# Patient Record
Sex: MALE | Race: WHITE | NOT HISPANIC OR LATINO | Employment: FULL TIME | URBAN - METROPOLITAN AREA
[De-identification: names, ages, dates, MRNs, and addresses within clinical notes are randomized per-mention and may not be internally consistent; named-entity substitution may affect disease eponyms.]

---

## 2017-04-19 ENCOUNTER — OFFICE VISIT (OUTPATIENT)
Dept: URGENT CARE | Facility: PHYSICIAN GROUP | Age: 27
End: 2017-04-19
Payer: COMMERCIAL

## 2017-04-19 VITALS
HEART RATE: 84 BPM | SYSTOLIC BLOOD PRESSURE: 122 MMHG | TEMPERATURE: 97.7 F | WEIGHT: 134 LBS | DIASTOLIC BLOOD PRESSURE: 70 MMHG | BODY MASS INDEX: 17.76 KG/M2 | HEIGHT: 73 IN | RESPIRATION RATE: 16 BRPM | OXYGEN SATURATION: 98 %

## 2017-04-19 DIAGNOSIS — H60.331 ACUTE SWIMMER'S EAR OF RIGHT SIDE: ICD-10-CM

## 2017-04-19 PROCEDURE — 99214 OFFICE O/P EST MOD 30 MIN: CPT | Performed by: FAMILY MEDICINE

## 2017-04-19 RX ORDER — CIPROFLOXACIN AND DEXAMETHASONE 3; 1 MG/ML; MG/ML
4 SUSPENSION/ DROPS AURICULAR (OTIC) 2 TIMES DAILY
Qty: 1 BOTTLE | Refills: 0 | Status: SHIPPED | OUTPATIENT
Start: 2017-04-19 | End: 2017-04-26

## 2017-04-19 RX ORDER — PREDNISONE 20 MG/1
40 TABLET ORAL EVERY MORNING
Qty: 12 TAB | Refills: 0 | Status: SHIPPED | OUTPATIENT
Start: 2017-04-19 | End: 2017-04-25

## 2017-04-19 ASSESSMENT — ENCOUNTER SYMPTOMS
DIZZINESS: 1
FOCAL WEAKNESS: 0
CHILLS: 0
FEVER: 0
SORE THROAT: 0

## 2017-04-19 NOTE — MR AVS SNAPSHOT
"        Praveen Martin   2017 9:45 AM   Office Visit   MRN: [id number]    Department:  [facility]   Dept Phone:  [phone]    Description:  Male : 1990   Provider:  Silvio Bond M.D.           Reason for Visit     Otalgia           Allergies as of 2017     No Known Allergies      You were diagnosed with     Acute swimmer's ear of right side   [[id number]]         Vital Signs     Blood Pressure Pulse Temperature Respirations Height Weight    122/70 mmHg 84 36.5 °C (97.7 °F) 16 1.854 m (6' 0.99\") 60.782 kg (134 lb)    Body Mass Index Oxygen Saturation Smoking Status             17.68 kg/m2 98% Never Smoker          Basic Information     Date Of Birth Sex Race Ethnicity Preferred Language    1990 Male White Non- English      Health Maintenance        Date Due Completion Dates    IMM HEP B VACCINE (1 of 3 - Primary Series) 1989 ---    IMM HEP A VACCINE (1 of 2 - Standard Series) 1990 ---    IMM VARICELLA (CHICKENPOX) VACCINE (1 of 2 - 2 Dose Adolescent Series) 2002 ---    IMM DTaP/Tdap/Td Vaccine (1 - Tdap) 2008 ---            Current Immunizations     No immunizations on file.      Below and/or attached are the medications your provider expects you to take. Review all of your home medications and newly ordered medications with your provider and/or pharmacist. Follow medication instructions as directed by your provider and/or pharmacist. Please keep your medication list with you and share with your provider. Update the information when medications are discontinued, doses are changed, or new medications (including over-the-counter products) are added; and carry medication information at all times in the event of emergency situations     Allergies:  No Known Allergies          Medications  Valid as of: 2017 - 10:45 AM    Generic Name Brand Name Tablet Size Instructions for use    Ciprofloxacin-Dexamethasone (Suspension) CIPRODEX 0.3-0.1 % Place 4 Drops in " right ear 2 times a day for 7 days.        Ondansetron HCl (Tab) ZOFRAN 4 MG Take 1 Tab by mouth every 8 hours as needed for Nausea/Vomiting.        PredniSONE (Tab) DELTASONE 20 MG Take 2 Tabs by mouth every morning for 6 days.        .                 Medicines prescribed today were sent to:     [id number]  [place], [place] - [address] [facility]    [address] [place] [address]    Phone: [phone] Fax: [phone]    Open 24 Hours?: No      Medication refill instructions:       If your prescription bottle indicates you have medication refills left, it is not necessary to call your provider’s office. Please contact your pharmacy and they will refill your medication.    If your prescription bottle indicates you do not have any refills left, you may request refills at any time through one of the following ways: The online Selvz system (except Urgent Care), by calling your provider’s office, or by asking your pharmacy to contact your provider’s office with a refill request. Medication refills are processed only during regular business hours and may not be available until the next business day. Your provider may request additional information or to have a follow-up visit with you prior to refilling your medication.   *Please Note: Medication refills are assigned a new Rx number when refilled electronically. Your pharmacy may indicate that no refills were authorized even though a new prescription for the same medication is available at the pharmacy. Please request the medicine by name with the pharmacy before contacting your provider for a refill.           Selvz Access Code: [email or web address]  Expires: 5/10/2017  1:03 PM    Selvz  A secure, online tool to manage your health information     [employer]’s Selvz® is a secure, online tool that connects you to your personalized health information from the privacy of your home -- day or night - making it very easy for you to manage your healthcare. Once the  activation process is completed, you can even access your medical information using the [employer] yarely, which is available for free in the Apple Yarely store or Google Play store.     [employer] provides the following levels of access (as shown below):   My Chart Features   [facility] Doctor [facility]  Specialists [facility]  Urgent  Care Non-[facility]  Primary Care  Doctor   Email your healthcare team securely and privately 24/7 X X X    Manage appointments: schedule your next appointment; view details of past/upcoming appointments X      Request prescription refills. X      View recent personal medical records, including lab and immunizations X X X X   View health record, including health history, allergies, medications X X X X   Read reports about your outpatient visits, procedures, consult and ER notes X X X X   See your discharge summary, which is a recap of your hospital and/or ER visit that includes your diagnosis, lab results, and care plan. X X       How to register for [employer]:  1. Go to  [email or web address].  2. Click on the Sign Up Now box, which takes you to the New Member Sign Up page. You will need to provide the following information:  a. Enter your [employer] Access Code exactly as it appears at the top of this page. (You will not need to use this code after you’ve completed the sign-up process. If you do not sign up before the expiration date, you must request a new code.)   b. Enter your date of birth.   c. Enter your home email address.   d. Click Submit, and follow the next screen’s instructions.  3. Create a [employer] ID. This will be your [employer] login ID and cannot be changed, so think of one that is secure and easy to remember.  4. Create a [employer] password. You can change your password at any time.  5. Enter your Password Reset Question and Answer. This can be used at a later time if you forget your password.   6. Enter your e-mail address. This allows you to receive e-mail notifications when new  information is available in [employer].  7. Click Sign Up. You can now view your health information.    For assistance activating your [employer] account, call [phone]

## 2017-04-19 NOTE — PROGRESS NOTES
"Subjective:      Praveen Martin is a 26 y.o. male who presents with Otalgia    Chief Complaint   Patient presents with   • Otalgia        - This is a very pleasant 26 y.o. male with complaints of Rt ear pain x 5 days. No NVFC/trauma or bleeding discharge from ear           ALLERGIES:  Review of patient's allergies indicates no known allergies.     PMH:  No past medical history on file.     MEDS:    Current outpatient prescriptions:   •  ciprofloxacin/dexamethasone (CIPRODEX) 0.3-0.1 % Suspension, Place 4 Drops in right ear 2 times a day for 7 days., Disp: 1 Bottle, Rfl: 0  •  ondansetron (ZOFRAN) 4 MG Tab tablet, Take 1 Tab by mouth every 8 hours as needed for Nausea/Vomiting., Disp: 6 Tab, Rfl: 1    ** Past medical, social, family and surgical history otherwise negative or non contributory **             Otalgia   Pertinent negatives include no sore throat.       Review of Systems   Constitutional: Negative for fever and chills.   HENT: Positive for ear pain. Negative for sore throat.    Neurological: Positive for dizziness. Negative for focal weakness.          Objective:     /70 mmHg  Pulse 84  Temp(Src) 36.5 °C (97.7 °F)  Resp 16  Ht 1.854 m (6' 0.99\")  Wt 60.782 kg (134 lb)  BMI 17.68 kg/m2  SpO2 98%     Physical Exam   Constitutional: He appears well-developed. No distress.   HENT:   Head: Normocephalic and atraumatic.   Mouth/Throat: Oropharynx is clear and moist.   Eyes: Conjunctivae are normal.   Neck: Neck supple.   Cardiovascular: Regular rhythm.    No murmur heard.  Neurological: He is alert. He exhibits normal muscle tone.   Skin: Skin is warm and dry.   Psychiatric: He has a normal mood and affect. Judgment normal.   Nursing note and vitals reviewed.  Rt ear: tender tragus, eac red swollen, TM pink/full/serous fluid  Lt ear: unremarkable             Assessment/Plan:         1. Acute swimmer's ear of right side  ciprofloxacin/dexamethasone (CIPRODEX) 0.3-0.1 % Suspension             Dx & d/c " instructions discussed w/ patient and/or family members. Follow up w/ Prvt Dr or here in 3-4 days if not getting better, sooner if needed,  ER if worse and UC/PCP unavailable.        Possible side effects (i.e. Rash, GI upset/constipation, sedation, elevation of BP or sugars) of any medications given discussed.

## 2020-11-12 ENCOUNTER — OFFICE VISIT (OUTPATIENT)
Dept: URGENT CARE | Facility: CLINIC | Age: 30
End: 2020-11-12
Payer: COMMERCIAL

## 2020-11-12 VITALS
RESPIRATION RATE: 16 BRPM | SYSTOLIC BLOOD PRESSURE: 122 MMHG | OXYGEN SATURATION: 98 % | HEART RATE: 86 BPM | TEMPERATURE: 97.5 F | DIASTOLIC BLOOD PRESSURE: 80 MMHG | HEIGHT: 73 IN | WEIGHT: 131 LBS | BODY MASS INDEX: 17.36 KG/M2

## 2020-11-12 DIAGNOSIS — R07.89 TIGHT CHEST: ICD-10-CM

## 2020-11-12 DIAGNOSIS — R42 DIZZINESS: ICD-10-CM

## 2020-11-12 PROCEDURE — 99215 OFFICE O/P EST HI 40 MIN: CPT | Performed by: NURSE PRACTITIONER

## 2020-11-12 PROCEDURE — 93000 ELECTROCARDIOGRAM COMPLETE: CPT | Performed by: NURSE PRACTITIONER

## 2020-11-12 RX ORDER — IBUPROFEN 200 MG
200 TABLET ORAL EVERY 6 HOURS PRN
COMMUNITY
End: 2021-01-07

## 2020-11-12 ASSESSMENT — ENCOUNTER SYMPTOMS
FEVER: 0
WEAKNESS: 0
SINUS PAIN: 0
SORE THROAT: 0
SENSORY CHANGE: 0
BACK PAIN: 0
ORTHOPNEA: 0
HEADACHES: 0
NAUSEA: 1
PALPITATIONS: 0
TINGLING: 0
DIZZINESS: 1
COUGH: 0
SPUTUM PRODUCTION: 0
SHORTNESS OF BREATH: 0
WHEEZING: 0
CHILLS: 0
MYALGIAS: 0

## 2020-11-13 NOTE — PROGRESS NOTES
Subjective:      Praveen Martin is a 30 y.o. male who presents with Chest Pain (x 4 days, chest tightness and pain accross chest, rapid heart beat, dizziness and shaky)            HPI  Chest pressure and pain with dizziness on and off x 1 month but has increased x 4 days, worst today. Father h/o MI in 50s. Non-smoker, no regular alcohol use. Mild nausea with dizziness. New PCP visit in December. Admits to no regular health checks. Headaches this week. Denies current back or abdominal pain. Stomach ache in last 2 days, not today. Denies heartburn.     PMH:  has no past medical history on file.  MEDS:   Current Outpatient Medications:   •  ibuprofen (MOTRIN) 200 MG Tab, Take 200 mg by mouth every 6 hours as needed., Disp: , Rfl:   •  ondansetron (ZOFRAN) 4 MG Tab tablet, Take 1 Tab by mouth every 8 hours as needed for Nausea/Vomiting. (Patient not taking: Reported on 11/12/2020), Disp: 6 Tab, Rfl: 1  ALLERGIES: No Known Allergies  SURGHX: History reviewed. No pertinent surgical history.  SOCHX:  reports that he has never smoked. He has never used smokeless tobacco.  FH: Family history was reviewed, no pertinent findings to report    Review of Systems   Constitutional: Negative for chills, fever and malaise/fatigue.   HENT: Negative for congestion, ear pain, sinus pain and sore throat.    Respiratory: Negative for cough, sputum production, shortness of breath and wheezing.    Cardiovascular: Positive for chest pain. Negative for palpitations and orthopnea.   Gastrointestinal: Positive for nausea.   Musculoskeletal: Negative for back pain and myalgias.   Skin: Negative for itching and rash.   Neurological: Positive for dizziness. Negative for tingling, sensory change, weakness and headaches.   Endo/Heme/Allergies: Negative for environmental allergies.   All other systems reviewed and are negative.         Objective:     /80 (BP Location: Left arm, Patient Position: Sitting, BP Cuff Size: Adult)   Pulse 86   Temp  "36.4 °C (97.5 °F) (Temporal)   Resp 16   Ht 1.854 m (6' 1\")   Wt 59.4 kg (131 lb)   SpO2 98%   BMI 17.28 kg/m²      Physical Exam  Vitals signs reviewed.   Constitutional:       General: He is awake. He is not in acute distress.     Appearance: Normal appearance. He is well-developed. He is not ill-appearing, toxic-appearing or diaphoretic.   HENT:      Head: Normocephalic.      Nose: Mucosal edema present.   Cardiovascular:      Rate and Rhythm: Normal rate and regular rhythm.      Heart sounds: Normal heart sounds, S1 normal and S2 normal. Heart sounds not distant. No murmur. No friction rub. No gallop.    Pulmonary:      Effort: Pulmonary effort is normal. No tachypnea, accessory muscle usage or respiratory distress.      Breath sounds: Normal breath sounds and air entry.   Abdominal:      General: There is no distension.      Palpations: Abdomen is soft.      Tenderness: There is no abdominal tenderness.   Musculoskeletal: Normal range of motion.   Skin:     General: Skin is warm and dry.   Neurological:      Mental Status: He is alert and oriented to person, place, and time.      GCS: GCS eye subscore is 4. GCS verbal subscore is 5. GCS motor subscore is 6.   Psychiatric:         Attention and Perception: Attention and perception normal.         Mood and Affect: Mood and affect normal.         Speech: Speech normal.         Behavior: Behavior normal. Behavior is cooperative.         Thought Content: Thought content normal.         Cognition and Memory: Cognition and memory normal.         Judgment: Judgment normal.                 Assessment/Plan:        1. Tight chest    - EKG - Cardiology Performed; Future: NSR, HR 81, ST,probable normal early repol; atrial premature complex    2. Dizziness    - EKG - Cardiology Performed; Future    Refer to ER for further evaluation for increased chest pressure and pain, episode of dizziness and nausea today  Patient stable, vitals stable  Patient to go POV with wife to " ER

## 2021-01-06 ASSESSMENT — ENCOUNTER SYMPTOMS
WHEEZING: 0
DYSPNEA ON EXERTION: 0
IRREGULAR HEARTBEAT: 0
ABDOMINAL PAIN: 0
SYNCOPE: 0
ORTHOPNEA: 0
NAUSEA: 0
FOCAL WEAKNESS: 0
DIARRHEA: 0
SHORTNESS OF BREATH: 0
VOMITING: 0
PND: 0
WEAKNESS: 0
FEVER: 0
NIGHT SWEATS: 0
COUGH: 0
NEAR-SYNCOPE: 0
DIZZINESS: 0

## 2021-01-07 ENCOUNTER — OFFICE VISIT (OUTPATIENT)
Dept: CARDIOLOGY | Facility: MEDICAL CENTER | Age: 31
End: 2021-01-07
Payer: COMMERCIAL

## 2021-01-07 VITALS
HEART RATE: 88 BPM | RESPIRATION RATE: 18 BRPM | OXYGEN SATURATION: 96 % | SYSTOLIC BLOOD PRESSURE: 92 MMHG | DIASTOLIC BLOOD PRESSURE: 64 MMHG | HEIGHT: 73 IN | WEIGHT: 128 LBS | BODY MASS INDEX: 16.96 KG/M2

## 2021-01-07 DIAGNOSIS — R00.2 PALPITATIONS: ICD-10-CM

## 2021-01-07 DIAGNOSIS — R07.89 OTHER CHEST PAIN: ICD-10-CM

## 2021-01-07 PROCEDURE — 99203 OFFICE O/P NEW LOW 30 MIN: CPT | Performed by: STUDENT IN AN ORGANIZED HEALTH CARE EDUCATION/TRAINING PROGRAM

## 2021-01-07 SDOH — HEALTH STABILITY: MENTAL HEALTH: HOW MANY STANDARD DRINKS CONTAINING ALCOHOL DO YOU HAVE ON A TYPICAL DAY?: 1 OR 2

## 2021-01-07 SDOH — HEALTH STABILITY: MENTAL HEALTH: HOW OFTEN DO YOU HAVE 6 OR MORE DRINKS ON ONE OCCASION?: WEEKLY

## 2021-01-07 SDOH — HEALTH STABILITY: MENTAL HEALTH: HOW OFTEN DO YOU HAVE A DRINK CONTAINING ALCOHOL?: MONTHLY OR LESS

## 2021-01-07 ASSESSMENT — ENCOUNTER SYMPTOMS: PALPITATIONS: 1

## 2021-01-07 NOTE — PATIENT INSTRUCTIONS
Schedule echocardiogram  Schedule treadmill EKG  Schedule event monitor    Obtain labs ordered by your primary care physician

## 2021-01-07 NOTE — PROGRESS NOTES
Cardiology Initial Consultation Note    Date of note:    1/7/2021    Primary Care Provider: Pcp Pt States None  Referring Provider: Yesi Waller*     Patient Name: Praveen Martin     YOB: 1990  MRN:              [id number]    Chief Complaint: Chest pain and palpitations    History of Present Illness: Mr. Praveen Martin is a 30 y.o. male with no prior cardiac history who is here for cardiac consultation for chest pain and palpitations.    The patient was last seen in urgent care clinic on November 12, 2020 for chest pain and palpitations.  He had been having chest pain with palpitations for the last 2 months.  He was subsequently seen in the ER, and EKG was normal and troponin was normal.  As such the patient was discharged to follow-up in cardiology clinic.  The patient was seen in primary care clinic on December 21, 2020 after ER visit.  He reports persistent chest pain.    He presents today with complaints of chest pain and dizziness daily for the last two months.  He reports that these episodes last for about 10 minutes, occurring about once a day.  There is no clear trigger, and these episodes are not related to activity or exertion.  He reports some lightheadedness with these episodes.  Denies any shortness of breath, orthopnea, or PND.  No leg swelling.  No syncope.      He denies any family history of sudden cardiac death or aortic dissection.  Father with MI at age 50.    Cardiovascular Risk Factors:  1. Smoking status: Never smoker  2. Type II Diabetes Mellitus: Unknown  3. Hypertension: None  4. Dyslipidemia: Unknown   5. Family history of early Coronary Artery Disease in a first degree relative (Male less than 55 years of age; Female less than 65 years of age): Father with MI at 50  6.  Obesity and/or Metabolic Syndrome: BMI 16.9  7. Sedentary lifestyle: Active    Review of Systems   Constitution: Negative for fever, malaise/fatigue and night sweats.   Cardiovascular: Positive  "for chest pain and palpitations. Negative for dyspnea on exertion, irregular heartbeat, leg swelling, near-syncope, orthopnea, paroxysmal nocturnal dyspnea and syncope.   Respiratory: Negative for cough, shortness of breath and wheezing.    Gastrointestinal: Negative for abdominal pain, diarrhea, nausea and vomiting.   Neurological: Negative for dizziness, focal weakness and weakness.       All other systems reviewed and are negative.       No current outpatient medications on file.     No current facility-administered medications for this visit.        No Known Allergies    Physical Exam:  Ambulatory Vitals  BP (!) 92/64 (BP Location: Left arm, Patient Position: Sitting, BP Cuff Size: Adult)   Pulse 88   Resp 18   Ht 1.854 m (6' 1\")   Wt 58.1 kg (128 lb)   SpO2 96%    Oxygen Therapy:  Pulse Oximetry: 96 %  BP Readings from Last 4 Encounters:   01/07/21 (!) 92/64   11/12/20 122/80   04/19/17 122/70   07/09/16 128/82       Weight/BMI: Body mass index is 16.89 kg/m².  Wt Readings from Last 4 Encounters:   01/07/21 58.1 kg (128 lb)   11/12/20 59.4 kg (131 lb)   04/19/17 60.8 kg (134 lb)   07/09/16 57.7 kg (127 lb 3.3 oz)         General: Well appearing and in no apparent distress.  Tall and thin.  Eyes: nl conjunctiva, no icteric sclera  ENT: wearing a mask, normal external appearance of ears  Neck: no visible JVP,  no carotid bruits  Lungs: normal respiratory effort, CTAB  Heart: RRR, no murmurs, no rubs or gallops,  no edema bilateral lower extremities. No LV/RV heave on cardiac palpatation. + bilateral radial pulses.  + bilateral dp pulses.   Abdomen: soft, non tender, non distended, no masses, normal bowel sounds.  No HSM.  Extremities/MSK: no clubbing, no cyanosis  Neurological: No focal sensory deficits  Psychiatric: Appropriate affect, A/O x 3, intact judgement and insight  Skin: Warm extremities      Lab Data Review:  Labs November 12, 2020  WBC 11.1 hemoglobin 16.1 platelet 226  Sodium 143 potassium 3.6 " creatinine 0.9  Troponin T 5.3 (normal)    Cardiac Imaging and Procedures Review:    EKG dated January 7, 2021: My personal interpretation is normal sinus rhythm    EKG November 12, 2020-poor quality, normal sinus    No prior echocardiogram    Chest x-ray November 12, 2020  No acute pulmonary process    Assessment & Plan     1. Other chest pain  EKG    EC-ECHOCARDIOGRAM COMPLETE W/O CONT    RI Treadmill Stress   2. Palpitations  EC-ECHOCARDIOGRAM COMPLETE W/O CONT    Cardiac Event Monitor    RI Treadmill Stress       Shared Medical Decision Making:    Chest pain  Palpitations  -We will obtain echocardiogram to assess LVEF and any structural abnormalities.  The patient is thin and tall, I would be particularly interested in looking at the ascending aorta.  -We will obtain event monitor to assess any arrhythmia  -Treadmill EKG to assess for ischemia  -CBC and thyroid levels pending (ordered by PCP)    Primary prevention  -Primary care physician has ordered lipid panel and hemoglobin A1c.  I will review results.    All of the patient's excellent questions were answered to the best of my knowledge and to his satisfaction.  It was a pleasure seeing Mr. Praveen Martin in my clinic today. Return in about 8 weeks (around 3/4/2021). Patient is aware to call the cardiology clinic with any questions or concerns.      Yrn Ansari MD  [facility]  [facility], [address]  [address] [address]  Phone: [phone]  Fax: [phone]

## 2021-01-08 ENCOUNTER — HOSPITAL ENCOUNTER (OUTPATIENT)
Dept: LAB | Facility: MEDICAL CENTER | Age: 31
End: 2021-01-08
Attending: FAMILY MEDICINE
Payer: COMMERCIAL

## 2021-01-08 LAB
25(OH)D3 SERPL-MCNC: 25 NG/ML (ref 30–100)
ALBUMIN SERPL BCP-MCNC: 5.1 G/DL (ref 3.2–4.9)
ALBUMIN/GLOB SERPL: 2.2 G/DL
ALP SERPL-CCNC: 61 U/L (ref 30–99)
ALT SERPL-CCNC: 15 U/L (ref 2–50)
ANION GAP SERPL CALC-SCNC: 9 MMOL/L (ref 7–16)
APPEARANCE UR: CLEAR
AST SERPL-CCNC: 19 U/L (ref 12–45)
BASOPHILS # BLD AUTO: 0.5 % (ref 0–1.8)
BASOPHILS # BLD: 0.03 K/UL (ref 0–0.12)
BILIRUB SERPL-MCNC: 0.8 MG/DL (ref 0.1–1.5)
BILIRUB UR QL STRIP.AUTO: NEGATIVE
BUN SERPL-MCNC: 13 MG/DL (ref 8–22)
CALCIUM SERPL-MCNC: 9.8 MG/DL (ref 8.5–10.5)
CHLORIDE SERPL-SCNC: 104 MMOL/L (ref 96–112)
CHOLEST SERPL-MCNC: 239 MG/DL (ref 100–199)
CO2 SERPL-SCNC: 25 MMOL/L (ref 20–33)
COLOR UR: YELLOW
CREAT SERPL-MCNC: 0.91 MG/DL (ref 0.5–1.4)
EOSINOPHIL # BLD AUTO: 0.15 K/UL (ref 0–0.51)
EOSINOPHIL NFR BLD: 2.6 % (ref 0–6.9)
ERYTHROCYTE [DISTWIDTH] IN BLOOD BY AUTOMATED COUNT: 39.8 FL (ref 35.9–50)
EST. AVERAGE GLUCOSE BLD GHB EST-MCNC: 105 MG/DL
GLOBULIN SER CALC-MCNC: 2.3 G/DL (ref 1.9–3.5)
GLUCOSE SERPL-MCNC: 84 MG/DL (ref 65–99)
GLUCOSE UR STRIP.AUTO-MCNC: NEGATIVE MG/DL
HBA1C MFR BLD: 5.3 % (ref 0–5.6)
HCT VFR BLD AUTO: 48.8 % (ref 42–52)
HDLC SERPL-MCNC: 57 MG/DL
HGB BLD-MCNC: 15.9 G/DL (ref 14–18)
IMM GRANULOCYTES # BLD AUTO: 0.02 K/UL (ref 0–0.11)
IMM GRANULOCYTES NFR BLD AUTO: 0.4 % (ref 0–0.9)
KETONES UR STRIP.AUTO-MCNC: NEGATIVE MG/DL
LDLC SERPL CALC-MCNC: 169 MG/DL
LEUKOCYTE ESTERASE UR QL STRIP.AUTO: NEGATIVE
LYMPHOCYTES # BLD AUTO: 2.27 K/UL (ref 1–4.8)
LYMPHOCYTES NFR BLD: 40 % (ref 22–41)
MCH RBC QN AUTO: 29.7 PG (ref 27–33)
MCHC RBC AUTO-ENTMCNC: 32.6 G/DL (ref 33.7–35.3)
MCV RBC AUTO: 91 FL (ref 81.4–97.8)
MICRO URNS: NORMAL
MONOCYTES # BLD AUTO: 0.54 K/UL (ref 0–0.85)
MONOCYTES NFR BLD AUTO: 9.5 % (ref 0–13.4)
NEUTROPHILS # BLD AUTO: 2.67 K/UL (ref 1.82–7.42)
NEUTROPHILS NFR BLD: 47 % (ref 44–72)
NITRITE UR QL STRIP.AUTO: NEGATIVE
NRBC # BLD AUTO: 0 K/UL
NRBC BLD-RTO: 0 /100 WBC
PH UR STRIP.AUTO: 6 [PH] (ref 5–8)
PLATELET # BLD AUTO: 213 K/UL (ref 164–446)
PMV BLD AUTO: 9.7 FL (ref 9–12.9)
POTASSIUM SERPL-SCNC: 3.7 MMOL/L (ref 3.6–5.5)
PROT SERPL-MCNC: 7.4 G/DL (ref 6–8.2)
PROT UR QL STRIP: NEGATIVE MG/DL
RBC # BLD AUTO: 5.36 M/UL (ref 4.7–6.1)
RBC UR QL AUTO: NEGATIVE
SODIUM SERPL-SCNC: 138 MMOL/L (ref 135–145)
SP GR UR STRIP.AUTO: 1.01
T4 FREE SERPL-MCNC: 1.32 NG/DL (ref 0.93–1.7)
TRIGL SERPL-MCNC: 66 MG/DL (ref 0–149)
TSH SERPL DL<=0.005 MIU/L-ACNC: 1.91 UIU/ML (ref 0.38–5.33)
UROBILINOGEN UR STRIP.AUTO-MCNC: 0.2 MG/DL
WBC # BLD AUTO: 5.7 K/UL (ref 4.8–10.8)

## 2021-01-08 PROCEDURE — 85025 COMPLETE CBC W/AUTO DIFF WBC: CPT

## 2021-01-08 PROCEDURE — 80053 COMPREHEN METABOLIC PANEL: CPT

## 2021-01-08 PROCEDURE — 84439 ASSAY OF FREE THYROXINE: CPT

## 2021-01-08 PROCEDURE — 81003 URINALYSIS AUTO W/O SCOPE: CPT

## 2021-01-08 PROCEDURE — 80061 LIPID PANEL: CPT

## 2021-01-08 PROCEDURE — 36415 COLL VENOUS BLD VENIPUNCTURE: CPT

## 2021-01-08 PROCEDURE — 83036 HEMOGLOBIN GLYCOSYLATED A1C: CPT

## 2021-01-08 PROCEDURE — 84443 ASSAY THYROID STIM HORMONE: CPT

## 2021-01-08 PROCEDURE — 82306 VITAMIN D 25 HYDROXY: CPT

## 2021-01-22 ENCOUNTER — NON-PROVIDER VISIT (OUTPATIENT)
Dept: CARDIOLOGY | Facility: MEDICAL CENTER | Age: 31
End: 2021-01-22
Payer: COMMERCIAL

## 2021-01-22 ENCOUNTER — TELEPHONE (OUTPATIENT)
Dept: CARDIOLOGY | Facility: MEDICAL CENTER | Age: 31
End: 2021-01-22

## 2021-01-22 DIAGNOSIS — R00.0 TACHYCARDIA: ICD-10-CM

## 2021-01-22 DIAGNOSIS — R00.2 PALPITATIONS: ICD-10-CM

## 2021-01-22 NOTE — TELEPHONE ENCOUNTER
>Pt. enrolled in 14 day Zio Patch program  >Hookup in [facility]  >Pending EOS.    INS: Enid DEGROOT

## 2021-01-26 ENCOUNTER — TELEPHONE (OUTPATIENT)
Dept: CARDIOLOGY | Facility: MEDICAL CENTER | Age: 31
End: 2021-01-26

## 2021-01-26 DIAGNOSIS — R06.02 SHORTNESS OF BREATH: ICD-10-CM

## 2021-01-26 DIAGNOSIS — R07.89 OTHER CHEST PAIN: ICD-10-CM

## 2021-01-26 DIAGNOSIS — R00.2 PALPITATIONS: ICD-10-CM

## 2021-01-27 ENCOUNTER — NON-PROVIDER VISIT (OUTPATIENT)
Dept: CARDIOLOGY | Facility: MEDICAL CENTER | Age: 31
End: 2021-01-27
Payer: COMMERCIAL

## 2021-01-27 VITALS
HEART RATE: 90 BPM | DIASTOLIC BLOOD PRESSURE: 60 MMHG | HEIGHT: 73 IN | BODY MASS INDEX: 16.96 KG/M2 | WEIGHT: 128 LBS | SYSTOLIC BLOOD PRESSURE: 100 MMHG | OXYGEN SATURATION: 95 %

## 2021-01-27 DIAGNOSIS — R00.2 PALPITATIONS: ICD-10-CM

## 2021-01-27 DIAGNOSIS — R07.89 OTHER CHEST PAIN: ICD-10-CM

## 2021-01-27 LAB — TREADMILL STRESS: NORMAL

## 2021-01-27 PROCEDURE — 93015 CV STRESS TEST SUPVJ I&R: CPT | Performed by: STUDENT IN AN ORGANIZED HEALTH CARE EDUCATION/TRAINING PROGRAM

## 2021-01-27 ASSESSMENT — FIBROSIS 4 INDEX: FIB4 SCORE: 0.71

## 2021-01-27 NOTE — TELEPHONE ENCOUNTER
HK    Pt called stating he has had about 4 episodes today where he has chest pain, shortness of breath, gets shaky and his heart rate rises to 105 for about 10-20 mins at a time. Tried calling nurse, but unavailable. Please call Pt back at [phone].

## 2021-01-27 NOTE — TELEPHONE ENCOUNTER
Active Symptoms  Yrn Ansari M.D.  You 1 hour ago (9:37 AM)     Thank you. If he is having active symptoms, I would have him seen in the ER. Is there a way to move up his echo and stress test to this week? Thanks!    Message text      Called pt and reviewed MD recommendations.  Pt states after phone call last night, he was feeling ok and that HR went to 90's-100.  Upon review of treadmill scheduling, there is an opening today at 1315.  Awaiting approval from SH, treadmill RN to accept request to reschedule testing.  Reassurance given, once this RN receives confirmation for 1315 appt, this RN return this call to schedule.    Received confirmation from MARCELO, RN of last minute testing.  Called pt to review findings.  He verbalizes understanding and states no other concerns or questions at this time.  Pt is appreciative of information given.    STAT echo placed.  Task deferred to RICKY PAR to re-schedule testing as requested by MD.

## 2021-01-27 NOTE — TELEPHONE ENCOUNTER
Returned pt call and reviewed findings.  Pt states symptoms he's been experiencing has been since October, daily.  He states the pain is more of tightness in the chest.  During the conversation pt states his HR was at 158bpm for 2 minutes and subsided.  He notes his HR today had been 105-110.  Instructed pt to perform valsalva maneuver: bearing down or breathing through a straw to try to get HR down and if it continues to be increased to go to ED or call EMS.  Informed pt we will need all testing to be completed to get a better picture and to r/o if it is a cardiac issue.  Pt states he was found to have a high LDL in his latest labs.  He verbalizes understanding and states no other concerns or questions at this time.    Findings relayed to MD for advise.

## 2021-01-28 ENCOUNTER — HOSPITAL ENCOUNTER (OUTPATIENT)
Dept: CARDIOLOGY | Facility: MEDICAL CENTER | Age: 31
End: 2021-01-28
Attending: STUDENT IN AN ORGANIZED HEALTH CARE EDUCATION/TRAINING PROGRAM
Payer: COMMERCIAL

## 2021-01-28 DIAGNOSIS — R06.02 SHORTNESS OF BREATH: ICD-10-CM

## 2021-01-28 DIAGNOSIS — R07.89 OTHER CHEST PAIN: ICD-10-CM

## 2021-01-28 DIAGNOSIS — R00.2 PALPITATIONS: ICD-10-CM

## 2021-01-28 PROCEDURE — 93306 TTE W/DOPPLER COMPLETE: CPT

## 2021-01-29 LAB
LV EJECT FRACT MOD 2C 99903: 67.37
LV EJECT FRACT MOD 4C 99902: 55.4
LV EJECT FRACT MOD BP 99901: 61.44

## 2021-01-29 PROCEDURE — 93306 TTE W/DOPPLER COMPLETE: CPT | Mod: 26 | Performed by: STUDENT IN AN ORGANIZED HEALTH CARE EDUCATION/TRAINING PROGRAM

## 2021-02-16 DIAGNOSIS — R00.2 PALPITATIONS: ICD-10-CM

## 2021-02-16 PROCEDURE — 93246 EXT ECG>7D<15D RECORDING: CPT | Performed by: STUDENT IN AN ORGANIZED HEALTH CARE EDUCATION/TRAINING PROGRAM

## 2021-02-16 PROCEDURE — 93248 EXT ECG>7D<15D REV&INTERPJ: CPT | Performed by: STUDENT IN AN ORGANIZED HEALTH CARE EDUCATION/TRAINING PROGRAM

## 2021-02-17 ENCOUNTER — APPOINTMENT (OUTPATIENT)
Dept: CARDIOLOGY | Facility: MEDICAL CENTER | Age: 31
End: 2021-02-17
Attending: STUDENT IN AN ORGANIZED HEALTH CARE EDUCATION/TRAINING PROGRAM
Payer: COMMERCIAL

## 2021-03-08 ASSESSMENT — ENCOUNTER SYMPTOMS
NIGHT SWEATS: 0
NAUSEA: 0
SYNCOPE: 0
DIARRHEA: 0
IRREGULAR HEARTBEAT: 0
NEAR-SYNCOPE: 0
WEAKNESS: 0
ORTHOPNEA: 0
VOMITING: 0
PALPITATIONS: 0
SHORTNESS OF BREATH: 0
COUGH: 0
WHEEZING: 0
FEVER: 0
DYSPNEA ON EXERTION: 0
DIZZINESS: 0
ABDOMINAL PAIN: 0
FOCAL WEAKNESS: 0
PND: 0

## 2021-03-08 NOTE — PROGRESS NOTES
Cardiology Follow-up Consultation Note/Virtual Video Visit Note    This encounter was conducted via Zoom.   Verbal consent was obtained. Patient's identity was verified.  As a means of avoiding spread of COVID-19, this visit is being conducted by Telemedicine.    Date of note:    3/9/2021    Primary Care Provider: Yesi Waller M.D.    Patient Name: Praveen Martin     YOB: 1990  MRN:              [id number]    Chief Complaint: Chest pain and palpitations    History of Present Illness: Mr. Praveen Martin is a 30 y.o. male with no prior cardiac history who is here for follow up chest pain and palpitations.    The patient was last seen in my clinic on 1/7/2021 after being seen in urgent care clinic for chest pain and palpitations.  He underwent an event monitor, which showed predominantly sinus rhythm with rare PACs and rare PVCs.  Symptoms correlated mostly with sinus rhythm and rarely sinus tachycardia and rarely SVE.  He also underwent a treadmill EKG, which was negative for ischemia  and the patient had good exercise capacity (ran 12 minutes 37 seconds).  An echocardiogram was obtained, which showed LVEF without any structural abnormalities.    The patient presents today for follow-up.  He reports that chest pain has been persistent and still having palpitations.  He reports chest pain as tightness that persists all day long, not worsened by exertion.  He reports that on the day of the treadmill, he had a mild chest tightness all throughout that was not worsened by the end of the exercise.  Chest tightness is described as not being able to fill his lungs due to the cold.   He reports occasional lightheadedness.  Denies any orthopnea, PND, or leg swelling.  No syncope.      Of note, he was evaluated in the ER ([facility]) for 2 month duration of chest pain with palpitations in 12/2020.  EKG was normal and troponin was normal.    Cardiovascular Risk Factors:  1. Smoking status: Never  "smoker  2. Type II Diabetes Mellitus: Unknown  3. Hypertension: None  4. Dyslipidemia: Diet controlled   5. Family history of early Coronary Artery Disease in a first degree relative (Male less than 55 years of age; Female less than 65 years of age): Father with MI at 50  6.  Obesity and/or Metabolic Syndrome: BMI 16.9  7. Sedentary lifestyle: Active    Review of Systems   Constitution: Negative for fever, malaise/fatigue and night sweats.   Cardiovascular: Negative for chest pain, dyspnea on exertion, irregular heartbeat, leg swelling, near-syncope, orthopnea, palpitations, paroxysmal nocturnal dyspnea and syncope.   Respiratory: Negative for cough, shortness of breath and wheezing.    Gastrointestinal: Negative for abdominal pain, diarrhea, nausea and vomiting.   Neurological: Negative for dizziness, focal weakness and weakness.       All other systems reviewed and are negative.       Current Outpatient Medications   Medication Sig Dispense Refill   • Calcium Acetate, Phos Binder, (CALCIUM ACETATE PO) Take 600 mg by mouth.     • MAGNESIUM CITRATE PO Take  by mouth.     • vitamin D (CHOLECALCIFEROL) 1000 Unit (25 mcg) Tab Take 1,000 Units by mouth every day.     • Ascorbic Acid (VITAMIN C) 1000 MG Tab Take  by mouth.       No current facility-administered medications for this visit.       No Known Allergies    Physical Exam:  Ambulatory Vitals  Pulse 79   Ht 1.854 m (6' 1\")   Wt 56.7 kg (125 lb)    Oxygen Therapy:     BP Readings from Last 4 Encounters:   01/27/21 100/60   01/07/21 (!) 92/64   11/12/20 122/80   04/19/17 122/70       Weight/BMI: Body mass index is 16.49 kg/m².  Wt Readings from Last 4 Encounters:   03/09/21 56.7 kg (125 lb)   01/27/21 58.1 kg (128 lb)   01/07/21 58.1 kg (128 lb)   11/12/20 59.4 kg (131 lb)         General: Well appearing and in no apparent distress.  Tall and thin.  Eyes: nl conjunctiva, no icteric sclera  ENT: wearing a mask, normal external appearance of ears  Neck: no visible " JVP,  no carotid bruits  Lungs: normal respiratory effort, CTAB  Heart: RRR, no murmurs, no rubs or gallops,  no edema bilateral lower extremities. No LV/RV heave on cardiac palpatation. + bilateral radial pulses.  + bilateral dp pulses.   Abdomen: soft, non tender, non distended, no masses, normal bowel sounds.  No HSM.  Extremities/MSK: no clubbing, no cyanosis  Neurological: No focal sensory deficits  Psychiatric: Appropriate affect, A/O x 3, intact judgement and insight  Skin: Warm extremities      Lab Data Review:  Labs November 12, 2020  WBC 11.1 hemoglobin 16.1 platelet 226  Sodium 143 potassium 3.6 creatinine 0.9  Troponin T 5.3 (normal)    Lab Results   Component Value Date/Time    CHOLSTRLTOT 239 (H) 01/08/2021 0702    TRIGLYCERIDE 66 01/08/2021 0702    HDL 57 01/08/2021 0702     (H) 01/08/2021 0702       Cardiac Imaging and Procedures Review:    EKG dated January 7, 2021: My personal interpretation is normal sinus rhythm    EKG November 12, 2020-poor quality, normal sinus    Echocardiogram January 29, 2020  CONCLUSIONS  Normal left ventricular systolic function. Left ventricular ejection   fraction is visually estimated to be 55%.   Normal right ventricular size and systolic function.  No hemodynamically significant valvular abnormalities.   No prior study is available for comparison.    Event monitor February 2021  Predominant underlying rhythm was sinus rhythm.  Minimum heart 46 bpm, maximum heart rate 190 bpm, average heart rate 81 bpm.  Rare isolated SVE's.  Rare SVE couplets.  Rare SVE triplets.  Rare isolated VE's.  No VE couplets or VE triplets.  Events correlated with mostly sinus rhythm and rarely sinus tachycardia.    Treadmill EKG January 27, 2021  Negative treadmill EKG for ischemia.   Normal blood pressure response to exercise.   Bass treadmill score +11 (low risk, provided no prior coronary artery disease)    Chest x-ray November 12, 2020  No acute pulmonary process    Assessment &  Plan     1. Other chest pain     2. Palpitations     3. Dyslipidemia         Shared Medical Decision Making:    Chest pain, atypical  Palpitations  Echocardiogram showed normal LVEF with no structural abnormalities. Event monitor showed predominantly sinus rhythm with rare PACs and PVCs. Treadmill EKG was negative for ischemia and the patient had good exercise capacity (ran 12 minutes 37 seconds)  -Discussed possibly starting beta-blocker for PACs/PVCs.  However, as the symptoms did not correlate much with ectopy, will hold off.  -We will refer back to primary care for evaluation of possible asthma and other noncardiac etiology of chest pain    Dyslipidemia  Patient has family history of early MI (father MI age 50).  LDL  169  -Discussed possibly starting statin given family history.  Patient would like to work on diet first, which I think is reasonable.  Discussed possible calcium score to help make the decision regarding statin.  We will hold off calcium score for now.    All of the patient's excellent questions were answered to the best of my knowledge and to his satisfaction.  It was a pleasure seeing Mr. Praveen Martin in my clinic today. Return in about 3 months (around 6/9/2021). Patient is aware to call the cardiology clinic with any questions or concerns.      Yrn Ansari MD  [facility]  [place] [facility], [address]  [address] [address]  Phone: [phone]  Fax: [phone]

## 2021-03-09 ENCOUNTER — TELEMEDICINE (OUTPATIENT)
Dept: CARDIOLOGY | Facility: MEDICAL CENTER | Age: 31
End: 2021-03-09
Payer: COMMERCIAL

## 2021-03-09 ENCOUNTER — TELEPHONE (OUTPATIENT)
Dept: CARDIOLOGY | Facility: MEDICAL CENTER | Age: 31
End: 2021-03-09

## 2021-03-09 VITALS — HEIGHT: 73 IN | HEART RATE: 79 BPM | BODY MASS INDEX: 16.57 KG/M2 | WEIGHT: 125 LBS

## 2021-03-09 DIAGNOSIS — E78.5 DYSLIPIDEMIA: ICD-10-CM

## 2021-03-09 DIAGNOSIS — R00.2 PALPITATIONS: ICD-10-CM

## 2021-03-09 DIAGNOSIS — R07.89 OTHER CHEST PAIN: ICD-10-CM

## 2021-03-09 PROCEDURE — 99213 OFFICE O/P EST LOW 20 MIN: CPT | Mod: 95,CR | Performed by: STUDENT IN AN ORGANIZED HEALTH CARE EDUCATION/TRAINING PROGRAM

## 2021-03-09 RX ORDER — MULTIVIT WITH MINERALS/LUTEIN
TABLET ORAL
COMMUNITY

## 2021-03-09 RX ORDER — VITAMIN B COMPLEX
1000 TABLET ORAL DAILY
COMMUNITY

## 2021-03-09 ASSESSMENT — FIBROSIS 4 INDEX: FIB4 SCORE: 0.71

## 2021-03-09 NOTE — LETTER
[facility]   [address] [facility] [facility] [address]  [place] [address]  Phone: [phone]  Fax: [phone]              Praveen Martin  1990    Encounter Date: 3/9/2021    Yrn Ansari M.D.          PROGRESS NOTE:      Cardiology Follow-up Consultation Note/Virtual Video Visit Note    This encounter was conducted via Zoom.   Verbal consent was obtained. Patient's identity was verified.  As a means of avoiding spread of COVID-19, this visit is being conducted by Telemedicine.    Date of note:    3/9/2021    Primary Care Provider: Yesi Waller M.D.    Patient Name: Praveen Martin     YOB: 1990  MRN:              [id number]    Chief Complaint: Chest pain and palpitations    History of Present Illness: Mr. Praveen Martin is a 30 y.o. male with no prior cardiac history who is here for follow up chest pain and palpitations.    The patient was last seen in my clinic on 1/7/2021 after being seen in urgent care clinic for chest pain and palpitations.  He underwent an event monitor, which showed predominantly sinus rhythm with rare PACs and rare PVCs.  Symptoms correlated mostly with sinus rhythm and rarely sinus tachycardia and rarely SVE.  He also underwent a treadmill EKG, which was negative for ischemia  and the patient had good exercise capacity (ran 12 minutes 37 seconds).  An echocardiogram was obtained, which showed LVEF without any structural abnormalities.    The patient presents today for follow-up.  He reports that chest pain has been persistent and still having palpitations.  He reports chest pain as tightness that persists all day long, not worsened by exertion.  He reports that on the day of the treadmill, he had a mild chest tightness all throughout that was not worsened by the end of the exercise.  Chest tightness is described as not being able to fill his lungs due to the cold.   He reports occasional lightheadedness.  Denies any orthopnea, PND,  "or leg swelling.  No syncope.      Of note, he was evaluated in the ER ([facility]) for 2 month duration of chest pain with palpitations in 12/2020.  EKG was normal and troponin was normal.    Cardiovascular Risk Factors:  1. Smoking status: Never smoker  2. Type II Diabetes Mellitus: Unknown  3. Hypertension: None  4. Dyslipidemia: Unknown   5. Family history of early Coronary Artery Disease in a first degree relative (Male less than 55 years of age; Female less than 65 years of age): Father with MI at 50  6.  Obesity and/or Metabolic Syndrome: BMI 16.9  7. Sedentary lifestyle: Active    Review of Systems   Constitution: Negative for fever, malaise/fatigue and night sweats.   Cardiovascular: Negative for chest pain, dyspnea on exertion, irregular heartbeat, leg swelling, near-syncope, orthopnea, palpitations, paroxysmal nocturnal dyspnea and syncope.   Respiratory: Negative for cough, shortness of breath and wheezing.    Gastrointestinal: Negative for abdominal pain, diarrhea, nausea and vomiting.   Neurological: Negative for dizziness, focal weakness and weakness.       All other systems reviewed and are negative.       Current Outpatient Medications   Medication Sig Dispense Refill   • Calcium Acetate, Phos Binder, (CALCIUM ACETATE PO) Take 600 mg by mouth.     • MAGNESIUM CITRATE PO Take  by mouth.     • vitamin D (CHOLECALCIFEROL) 1000 Unit (25 mcg) Tab Take 1,000 Units by mouth every day.     • Ascorbic Acid (VITAMIN C) 1000 MG Tab Take  by mouth.       No current facility-administered medications for this visit.       No Known Allergies    Physical Exam:  Ambulatory Vitals  Pulse 79   Ht 1.854 m (6' 1\")   Wt 56.7 kg (125 lb)    Oxygen Therapy:     BP Readings from Last 4 Encounters:   01/27/21 100/60   01/07/21 (!) 92/64   11/12/20 122/80   04/19/17 122/70       Weight/BMI: Body mass index is 16.49 kg/m².  Wt Readings from Last 4 Encounters:   03/09/21 56.7 kg (125 lb)   01/27/21 58.1 kg (128 lb)   "   01/07/21 58.1 kg (128 lb)   11/12/20 59.4 kg (131 lb)         General: Well appearing and in no apparent distress.  Tall and thin.  Eyes: nl conjunctiva, no icteric sclera  ENT: wearing a mask, normal external appearance of ears  Neck: no visible JVP,  no carotid bruits  Lungs: normal respiratory effort, CTAB  Heart: RRR, no murmurs, no rubs or gallops,  no edema bilateral lower extremities. No LV/RV heave on cardiac palpatation. + bilateral radial pulses.  + bilateral dp pulses.   Abdomen: soft, non tender, non distended, no masses, normal bowel sounds.  No HSM.  Extremities/MSK: no clubbing, no cyanosis  Neurological: No focal sensory deficits  Psychiatric: Appropriate affect, A/O x 3, intact judgement and insight  Skin: Warm extremities      Lab Data Review:  Labs November 12, 2020  WBC 11.1 hemoglobin 16.1 platelet 226  Sodium 143 potassium 3.6 creatinine 0.9  Troponin T 5.3 (normal)    Cardiac Imaging and Procedures Review:    EKG dated January 7, 2021: My personal interpretation is normal sinus rhythm    EKG November 12, 2020-poor quality, normal sinus    Echocardiogram January 29, 2020  CONCLUSIONS  Normal left ventricular systolic function. Left ventricular ejection   fraction is visually estimated to be 55%.   Normal right ventricular size and systolic function.  No hemodynamically significant valvular abnormalities.   No prior study is available for comparison.    Event monitor February 2021  Predominant underlying rhythm was sinus rhythm.  Minimum heart 46 bpm, maximum heart rate 190 bpm, average heart rate 81 bpm.  Rare isolated SVE's.  Rare SVE couplets.  Rare SVE triplets.  Rare isolated VE's.  No VE couplets or VE triplets.  Events correlated with mostly sinus rhythm and rarely sinus tachycardia.    Treadmill EKG January 27, 2021  Negative treadmill EKG for ischemia.   Normal blood pressure response to exercise.   Bass treadmill score +11 (low risk, provided no prior coronary artery  disease)    Chest x-ray November 12, 2020  No acute pulmonary process    Assessment & Plan     1. Other chest pain     2. Palpitations     3. Dyslipidemia         Shared Medical Decision Making:    Chest pain, atypical  Palpitations  Echocardiogram showed normal LVEF with no structural abnormalities. Event monitor showed predominantly sinus rhythm with rare PACs and PVCs. Treadmill EKG was negative for ischemia and the patient had good exercise capacity (ran 12 minutes 37 seconds)  -Discussed possibly starting beta-blocker for PACs/PVCs.  However, as the symptoms did not correlate much with ectopy, will hold off.  -We will refer back to primary care for evaluation of possible asthma and other noncardiac etiology of chest pain    Dyslipidemia  Patient has family history of early MI (father MI age 50).  LDL ~170 (records pending)  -Discussed possibly starting statin given family history.  Patient will like to work on diet first, which I think is reasonable.  Discussed possible calcium score to help make the decision regarding statin.  We will hold off calcium score for now.    All of the patient's excellent questions were answered to the best of my knowledge and to his satisfaction.  It was a pleasure seeing Mr. Praveen Martin in my clinic today. Return in about 3 months (around 6/9/2021). Patient is aware to call the cardiology clinic with any questions or concerns.      Yrn Ansari MD  [facility]  [place] [facility], [facility]  [address] [address]  Phone: [phone]  Fax: [phone]          Yesi Waller M.D.  [address] [place] [address]  Via Fax: [phone]

## 2021-03-09 NOTE — TELEPHONE ENCOUNTER
Received lipid panel results from patients PCP. Most recent results were already in chart. Called patient to verify if those were the last lab orders. Patient confirmed and in today's visit with Dr. Ansari, patient stated that his PCP wanted to see when patient can do a repeat for cholesterol, but did not order any labs yet.     To discussed with Dr. Ansari.  
Requested STAT records of most recent lipid panel results from patients PCP Dr. Yesi Levin at [facility] P# [phone] F# [phone].     Confirmation fax received and sent to scan.  
normal (ped)...

## 2021-04-05 ENCOUNTER — HOSPITAL ENCOUNTER (OUTPATIENT)
Dept: LAB | Facility: MEDICAL CENTER | Age: 31
End: 2021-04-05
Attending: INTERNAL MEDICINE
Payer: COMMERCIAL

## 2021-04-05 LAB
APPEARANCE UR: CLEAR
BASOPHILS # BLD AUTO: 0.4 % (ref 0–1.8)
BASOPHILS # BLD: 0.03 K/UL (ref 0–0.12)
BILIRUB UR QL STRIP.AUTO: NEGATIVE
COLOR UR: YELLOW
EOSINOPHIL # BLD AUTO: 0.21 K/UL (ref 0–0.51)
EOSINOPHIL NFR BLD: 2.7 % (ref 0–6.9)
ERYTHROCYTE [DISTWIDTH] IN BLOOD BY AUTOMATED COUNT: 39.3 FL (ref 35.9–50)
GLUCOSE UR STRIP.AUTO-MCNC: NEGATIVE MG/DL
HCT VFR BLD AUTO: 44.4 % (ref 42–52)
HGB BLD-MCNC: 15.1 G/DL (ref 14–18)
IMM GRANULOCYTES # BLD AUTO: 0.02 K/UL (ref 0–0.11)
IMM GRANULOCYTES NFR BLD AUTO: 0.3 % (ref 0–0.9)
KETONES UR STRIP.AUTO-MCNC: NEGATIVE MG/DL
LEUKOCYTE ESTERASE UR QL STRIP.AUTO: NEGATIVE
LYMPHOCYTES # BLD AUTO: 2.52 K/UL (ref 1–4.8)
LYMPHOCYTES NFR BLD: 32.2 % (ref 22–41)
MCH RBC QN AUTO: 30.8 PG (ref 27–33)
MCHC RBC AUTO-ENTMCNC: 34 G/DL (ref 33.7–35.3)
MCV RBC AUTO: 90.6 FL (ref 81.4–97.8)
MICRO URNS: NORMAL
MONOCYTES # BLD AUTO: 0.49 K/UL (ref 0–0.85)
MONOCYTES NFR BLD AUTO: 6.3 % (ref 0–13.4)
NEUTROPHILS # BLD AUTO: 4.55 K/UL (ref 1.82–7.42)
NEUTROPHILS NFR BLD: 58.1 % (ref 44–72)
NITRITE UR QL STRIP.AUTO: NEGATIVE
NRBC # BLD AUTO: 0 K/UL
NRBC BLD-RTO: 0 /100 WBC
PH UR STRIP.AUTO: 7 [PH] (ref 5–8)
PLATELET # BLD AUTO: 207 K/UL (ref 164–446)
PMV BLD AUTO: 9.5 FL (ref 9–12.9)
PROT UR QL STRIP: NEGATIVE MG/DL
RBC # BLD AUTO: 4.9 M/UL (ref 4.7–6.1)
RBC UR QL AUTO: NEGATIVE
SP GR UR STRIP.AUTO: 1.01
UROBILINOGEN UR STRIP.AUTO-MCNC: 0.2 MG/DL
WBC # BLD AUTO: 7.8 K/UL (ref 4.8–10.8)

## 2021-04-05 PROCEDURE — 83735 ASSAY OF MAGNESIUM: CPT

## 2021-04-05 PROCEDURE — 82306 VITAMIN D 25 HYDROXY: CPT

## 2021-04-05 PROCEDURE — 84482 T3 REVERSE: CPT

## 2021-04-05 PROCEDURE — 84481 FREE ASSAY (FT-3): CPT

## 2021-04-05 PROCEDURE — 85025 COMPLETE CBC W/AUTO DIFF WBC: CPT

## 2021-04-05 PROCEDURE — 84443 ASSAY THYROID STIM HORMONE: CPT

## 2021-04-05 PROCEDURE — 81003 URINALYSIS AUTO W/O SCOPE: CPT

## 2021-04-05 PROCEDURE — 80061 LIPID PANEL: CPT

## 2021-04-05 PROCEDURE — 84439 ASSAY OF FREE THYROXINE: CPT

## 2021-04-05 PROCEDURE — 80053 COMPREHEN METABOLIC PANEL: CPT

## 2021-04-05 PROCEDURE — 36415 COLL VENOUS BLD VENIPUNCTURE: CPT

## 2021-04-05 PROCEDURE — 82652 VIT D 1 25-DIHYDROXY: CPT

## 2021-04-06 LAB
ALBUMIN SERPL BCP-MCNC: 5.1 G/DL (ref 3.2–4.9)
ALBUMIN/GLOB SERPL: 2.1 G/DL
ALP SERPL-CCNC: 77 U/L (ref 30–99)
ALT SERPL-CCNC: 24 U/L (ref 2–50)
ANION GAP SERPL CALC-SCNC: 11 MMOL/L (ref 7–16)
AST SERPL-CCNC: 21 U/L (ref 12–45)
BILIRUB SERPL-MCNC: 0.5 MG/DL (ref 0.1–1.5)
BUN SERPL-MCNC: 8 MG/DL (ref 8–22)
CALCIUM SERPL-MCNC: 9.9 MG/DL (ref 8.5–10.5)
CHLORIDE SERPL-SCNC: 104 MMOL/L (ref 96–112)
CHOLEST SERPL-MCNC: 153 MG/DL (ref 100–199)
CO2 SERPL-SCNC: 23 MMOL/L (ref 20–33)
CREAT SERPL-MCNC: 0.85 MG/DL (ref 0.5–1.4)
FASTING STATUS PATIENT QL REPORTED: NORMAL
GLOBULIN SER CALC-MCNC: 2.4 G/DL (ref 1.9–3.5)
GLUCOSE SERPL-MCNC: 88 MG/DL (ref 65–99)
HDLC SERPL-MCNC: 51 MG/DL
LDLC SERPL CALC-MCNC: 95 MG/DL
MAGNESIUM SERPL-MCNC: 2.1 MG/DL (ref 1.5–2.5)
POTASSIUM SERPL-SCNC: 3.8 MMOL/L (ref 3.6–5.5)
PROT SERPL-MCNC: 7.5 G/DL (ref 6–8.2)
SODIUM SERPL-SCNC: 138 MMOL/L (ref 135–145)
T3FREE SERPL-MCNC: 3.38 PG/ML (ref 2–4.4)
T4 FREE SERPL-MCNC: 1.35 NG/DL (ref 0.93–1.7)
TRIGL SERPL-MCNC: 33 MG/DL (ref 0–149)
TSH SERPL DL<=0.005 MIU/L-ACNC: 1.59 UIU/ML (ref 0.38–5.33)

## 2021-04-07 LAB
1,25(OH)2D3 SERPL-MCNC: 63.1 PG/ML (ref 19.9–79.3)
25(OH)D3 SERPL-MCNC: 40 NG/ML (ref 30–80)

## 2021-04-09 LAB — T3REVERSE SERPL-MCNC: 14.7 NG/DL (ref 9–27)

## 2024-03-09 ENCOUNTER — OFFICE VISIT (OUTPATIENT)
Dept: URGENT CARE | Facility: PHYSICIAN GROUP | Age: 34
End: 2024-03-09
Payer: COMMERCIAL

## 2024-03-09 VITALS
BODY MASS INDEX: 17.23 KG/M2 | WEIGHT: 130 LBS | HEART RATE: 91 BPM | SYSTOLIC BLOOD PRESSURE: 116 MMHG | RESPIRATION RATE: 20 BRPM | HEIGHT: 73 IN | OXYGEN SATURATION: 98 % | TEMPERATURE: 98.9 F | DIASTOLIC BLOOD PRESSURE: 78 MMHG

## 2024-03-09 DIAGNOSIS — H10.9 BACTERIAL CONJUNCTIVITIS OF BOTH EYES: ICD-10-CM

## 2024-03-09 DIAGNOSIS — R68.89 FLU-LIKE SYMPTOMS: ICD-10-CM

## 2024-03-09 DIAGNOSIS — J01.90 ACUTE BACTERIAL SINUSITIS: Primary | ICD-10-CM

## 2024-03-09 DIAGNOSIS — R05.9 COUGH IN ADULT PATIENT: ICD-10-CM

## 2024-03-09 DIAGNOSIS — B96.89 ACUTE BACTERIAL SINUSITIS: Primary | ICD-10-CM

## 2024-03-09 DIAGNOSIS — J02.9 PHARYNGITIS, UNSPECIFIED ETIOLOGY: ICD-10-CM

## 2024-03-09 DIAGNOSIS — B96.89 BACTERIAL CONJUNCTIVITIS OF BOTH EYES: ICD-10-CM

## 2024-03-09 LAB
FLUAV RNA SPEC QL NAA+PROBE: NEGATIVE
FLUBV RNA SPEC QL NAA+PROBE: NEGATIVE
RSV RNA SPEC QL NAA+PROBE: NEGATIVE
S PYO DNA SPEC NAA+PROBE: NOT DETECTED
SARS-COV-2 RNA RESP QL NAA+PROBE: NEGATIVE

## 2024-03-09 PROCEDURE — 3074F SYST BP LT 130 MM HG: CPT | Performed by: NURSE PRACTITIONER

## 2024-03-09 PROCEDURE — 87651 STREP A DNA AMP PROBE: CPT | Performed by: NURSE PRACTITIONER

## 2024-03-09 PROCEDURE — 99203 OFFICE O/P NEW LOW 30 MIN: CPT | Performed by: NURSE PRACTITIONER

## 2024-03-09 PROCEDURE — 0241U POCT CEPHEID COV-2, FLU A/B, RSV - PCR: CPT | Performed by: NURSE PRACTITIONER

## 2024-03-09 PROCEDURE — 3078F DIAST BP <80 MM HG: CPT | Performed by: NURSE PRACTITIONER

## 2024-03-09 RX ORDER — AMOXICILLIN AND CLAVULANATE POTASSIUM 875; 125 MG/1; MG/1
1 TABLET, FILM COATED ORAL 2 TIMES DAILY
Qty: 14 TABLET | Refills: 0 | Status: SHIPPED | OUTPATIENT
Start: 2024-03-09 | End: 2024-03-16

## 2024-03-09 RX ORDER — POLYMYXIN B SULFATE AND TRIMETHOPRIM 1; 10000 MG/ML; [USP'U]/ML
1 SOLUTION OPHTHALMIC EVERY 4 HOURS
Qty: 10 ML | Refills: 0 | Status: SHIPPED | OUTPATIENT
Start: 2024-03-09 | End: 2024-03-16

## 2024-03-09 RX ORDER — DEXAMETHASONE SODIUM PHOSPHATE 10 MG/ML
10 INJECTION INTRAMUSCULAR; INTRAVENOUS ONCE
Status: COMPLETED | OUTPATIENT
Start: 2024-03-09 | End: 2024-03-09

## 2024-03-09 RX ADMIN — DEXAMETHASONE SODIUM PHOSPHATE 10 MG: 10 INJECTION INTRAMUSCULAR; INTRAVENOUS at 13:47

## 2024-03-09 ASSESSMENT — ENCOUNTER SYMPTOMS
DOUBLE VISION: 0
DIARRHEA: 0
BLURRED VISION: 0
COUGH: 1
ABDOMINAL PAIN: 0
EYE REDNESS: 1
VOMITING: 0
SORE THROAT: 1
EYE PAIN: 1
PHOTOPHOBIA: 0
NAUSEA: 0
EYE DISCHARGE: 1
HEADACHES: 1

## 2024-03-09 NOTE — PROGRESS NOTES
Subjective:     Praveen Martin is a 33 y.o. male who presents for Pharyngitis (4 DAYS ) and Conjunctivitis (2 DAYS )      Pharyngitis   Associated symptoms include congestion, coughing and headaches. Pertinent negatives include no abdominal pain, diarrhea or vomiting.   Conjunctivitis  Associated symptoms include congestion, coughing, headaches and a sore throat. Pertinent negatives include no abdominal pain, nausea or vomiting.     Pt presents for evaluation of a new problem.  Praveen is a very pleasant 33-year-old male who presents to urgent care today with complaints of sore throat that has been ongoing for the past week.  He does endorse congestion and cough.  His entire family has been ill with similar symptoms and notes that his son was placed on antibiotics and shortly had resolution of symptoms following this.  He has been using over-the-counter supportive treatment for his symptoms.  2 days ago he developed erythema of bilateral eyes.  He is now experiencing drainage, itching and pain.  He has been using Visine for relief of symptoms.  He notes his son also bacterial conjunctivitis.    Review of Systems   HENT:  Positive for congestion and sore throat.    Eyes:  Positive for pain, discharge and redness. Negative for blurred vision, double vision and photophobia.   Respiratory:  Positive for cough.    Gastrointestinal:  Negative for abdominal pain, diarrhea, nausea and vomiting.   Neurological:  Positive for headaches.       PMH: No past medical history on file.  ALLERGIES: No Known Allergies  SURGHX: No past surgical history on file.  SOCHX:   Social History     Socioeconomic History    Marital status:    Tobacco Use    Smoking status: Never    Smokeless tobacco: Never   Vaping Use    Vaping Use: Never used   Substance and Sexual Activity    Alcohol use: Not Currently     Comment: was once a week, pt states he cut back     Drug use: Never     FH: No family history on file.      Objective:   BP  "116/78   Pulse 91   Temp 37.2 °C (98.9 °F) (Temporal)   Resp 20   Ht 1.854 m (6' 1\")   Wt 59 kg (130 lb)   SpO2 98%   BMI 17.15 kg/m²     Physical Exam  Constitutional:       Appearance: He is normal weight. He is ill-appearing.   HENT:      Head: Normocephalic and atraumatic.      Right Ear: Tympanic membrane, ear canal and external ear normal.      Left Ear: Tympanic membrane, ear canal and external ear normal.      Nose: Congestion and rhinorrhea present.      Mouth/Throat:      Mouth: Mucous membranes are moist.      Pharynx: Posterior oropharyngeal erythema present. No oropharyngeal exudate.   Eyes:      General:         Right eye: No discharge.         Left eye: No discharge.      Extraocular Movements: Extraocular movements intact.      Conjunctiva/sclera: Conjunctivae normal.      Pupils: Pupils are equal, round, and reactive to light.   Cardiovascular:      Rate and Rhythm: Normal rate.      Heart sounds: Normal heart sounds. No murmur heard.  Pulmonary:      Effort: Pulmonary effort is normal.      Breath sounds: Normal breath sounds.   Abdominal:      General: Abdomen is flat. There is no distension.      Palpations: Abdomen is soft.   Musculoskeletal:         General: Normal range of motion.      Cervical back: Normal range of motion. Tenderness present. Muscular tenderness present.   Lymphadenopathy:      Cervical: Cervical adenopathy present.   Skin:     General: Skin is warm and dry.      Capillary Refill: Capillary refill takes less than 2 seconds.   Neurological:      General: No focal deficit present.      Mental Status: He is alert and oriented to person, place, and time. Mental status is at baseline.   Psychiatric:         Mood and Affect: Mood normal.         Behavior: Behavior normal.         Thought Content: Thought content normal.         Judgment: Judgment normal.       Results for orders placed or performed in visit on 03/09/24   POCT CoV-2, Flu A/B, RSV by PCR   Result Value Ref " Range    SARS-CoV-2 by PCR Negative Negative, Invalid    Influenza virus A RNA Negative Negative, Invalid    Influenza virus B, PCR Negative Negative, Invalid    RSV, PCR Negative Negative, Invalid   POCT CEPHEID GROUP A STREP - PCR   Result Value Ref Range    POC Group A Strep, PCR Not Detected Not Detected, Invalid       Assessment/Plan:   Assessment      1. Acute bacterial sinusitis  amoxicillin-clavulanate (AUGMENTIN) 875-125 MG Tab      2. Flu-like symptoms  POCT CoV-2, Flu A/B, RSV by PCR    POCT CEPHEID GROUP A STREP - PCR      3. Bacterial conjunctivitis of both eyes  polymixin-trimethoprim (POLYTRIM) 10313-2.1 UNIT/ML-% Solution      4. Cough in adult patient  POCT CoV-2, Flu A/B, RSV by PCR    POCT CEPHEID GROUP A STREP - PCR      5. Pharyngitis, unspecified etiology  POCT CEPHEID GROUP A STREP - PCR    dexamethasone (Decadron) injection (check route below) 10 mg      We discussed supportive measures including humidifier, warm salt water gargles, over-the-counter Cepacol throat lozenges, rest  and increased fluids. Pt was encouraged to seek treatment back in the ER or urgent care for worsening symptoms,  fever greater than 100.5, wheezes or shortness of breath.       18-Apr-2023 01:42